# Patient Record
Sex: MALE | Race: WHITE | Employment: FULL TIME | URBAN - METROPOLITAN AREA
[De-identification: names, ages, dates, MRNs, and addresses within clinical notes are randomized per-mention and may not be internally consistent; named-entity substitution may affect disease eponyms.]

---

## 2018-05-06 ENCOUNTER — OFFICE VISIT (OUTPATIENT)
Dept: URGENT CARE | Facility: URGENT CARE | Age: 23
End: 2018-05-06
Payer: COMMERCIAL

## 2018-05-06 VITALS
HEART RATE: 76 BPM | SYSTOLIC BLOOD PRESSURE: 114 MMHG | BODY MASS INDEX: 22.92 KG/M2 | TEMPERATURE: 98.1 F | WEIGHT: 169 LBS | OXYGEN SATURATION: 100 % | DIASTOLIC BLOOD PRESSURE: 68 MMHG

## 2018-05-06 DIAGNOSIS — J30.1 ACUTE SEASONAL ALLERGIC RHINITIS DUE TO POLLEN: Primary | ICD-10-CM

## 2018-05-06 PROCEDURE — 99214 OFFICE O/P EST MOD 30 MIN: CPT | Performed by: FAMILY MEDICINE

## 2018-05-06 RX ORDER — OLOPATADINE HYDROCHLORIDE 1 MG/ML
1 SOLUTION/ DROPS OPHTHALMIC 2 TIMES DAILY
Qty: 5 ML | Refills: 3 | Status: SHIPPED | OUTPATIENT
Start: 2018-05-06 | End: 2021-09-07

## 2018-05-06 RX ORDER — OLOPATADINE HYDROCHLORIDE 1 MG/ML
1 SOLUTION/ DROPS OPHTHALMIC 2 TIMES DAILY
Qty: 5 ML | Refills: 3 | Status: CANCELLED | OUTPATIENT
Start: 2018-05-06

## 2018-05-06 RX ORDER — FLUTICASONE PROPIONATE 50 MCG
1-2 SPRAY, SUSPENSION (ML) NASAL DAILY
Qty: 1 BOTTLE | Refills: 11 | Status: SHIPPED | OUTPATIENT
Start: 2018-05-06 | End: 2020-10-23

## 2018-05-06 RX ORDER — PREDNISONE 20 MG/1
20 TABLET ORAL DAILY
Qty: 5 TABLET | Refills: 0 | Status: SHIPPED | OUTPATIENT
Start: 2018-05-06 | End: 2020-10-23

## 2018-05-06 NOTE — MR AVS SNAPSHOT
"              After Visit Summary   2018    Huber Bueno    MRN: [id number]           Patient Information     Date Of Birth          1995        Visit Information        Provider Department      2018 9:40 AM Fausto Neal MD [facility]        Today's Diagnoses     Acute seasonal allergic rhinitis due to pollen    -  1       Follow-ups after your visit        Who to contact     If you have questions or need follow up information about today's clinic visit or your schedule please contact [facility] directly at [phone].  Normal or non-critical lab and imaging results will be communicated to you by WorkThinkhart, letter or phone within 4 business days after the clinic has received the results. If you do not hear from us within 7 days, please contact the clinic through Verblingt or phone. If you have a critical or abnormal lab result, we will notify you by phone as soon as possible.  Submit refill requests through ControlCircle or call your pharmacy and they will forward the refill request to us. Please allow 3 business days for your refill to be completed.          Additional Information About Your Visit        MyChart Information     ControlCircle lets you send messages to your doctor, view your test results, renew your prescriptions, schedule appointments and more. To sign up, go to [email or web address] . Click on \"Log in\" on the left side of the screen, which will take you to the Welcome page. Then click on \"Sign up Now\" on the right side of the page.     You will be asked to enter the access code listed below, as well as some personal information. Please follow the directions to create your username and password.     Your access code is: [email or web address]  Expires: 2018  3:06 PM     Your access code will  in 90 days. If you need help or a new code, please call your [place] clinic or [phone].        Care EveryWhere ID     This is your Care EveryWhere ID. " This could be used by other organizations to access your [place] medical records  [id number]        Your Vitals Were     Pulse Temperature Pulse Oximetry BMI (Body Mass Index)          76 98.1  F (36.7  C) (Oral) 100% 22.92 kg/m2         Blood Pressure from Last 3 Encounters:   05/06/18 114/68   02/19/16 100/74   10/30/15 110/70    Weight from Last 3 Encounters:   05/06/18 169 lb (76.7 kg)   02/19/16 189 lb (85.7 kg)   10/30/15 182 lb (82.6 kg)              Today, you had the following     No orders found for display         Today's Medication Changes          These changes are accurate as of 5/6/18 11:59 PM.  If you have any questions, ask your nurse or doctor.               Start taking these medicines.        Dose/Directions    fluticasone 50 MCG/ACT spray   Commonly known as:  FLONASE   Used for:  Acute seasonal allergic rhinitis due to pollen   Started by:  Fausto Neal MD        Dose:  1-2 spray   Spray 1-2 sprays into both nostrils daily   Quantity:  1 Bottle   Refills:  11       olopatadine 0.1 % ophthalmic solution   Commonly known as:  PATANOL   Used for:  Acute seasonal allergic rhinitis due to pollen   Started by:  Fausto Neal MD        Dose:  1 drop   Place 1 drop into both eyes 2 times daily   Quantity:  5 mL   Refills:  3       predniSONE 20 MG tablet   Commonly known as:  DELTASONE   Used for:  Acute seasonal allergic rhinitis due to pollen   Started by:  Fausto Neal MD        Dose:  20 mg   Take 1 tablet (20 mg) by mouth daily   Quantity:  5 tablet   Refills:  0            Where to get your medicines      These medications were sent to [facility]/pharmacy #[id number] - [place], [place] - [address]  [address], [place] [address]     Phone:  [phone]     fluticasone 50 MCG/ACT spray    olopatadine 0.1 % ophthalmic solution    predniSONE 20 MG tablet                Primary Care Provider Office Phone # Fax #    Kiel Hernadez [phone]       [id number]  [facility] [address]        Equal Access to Services     RAAD GUTIERREZ : Hadii aad ku hadestelleamairani Sojohnny, waaxda luqadaha, qaybta miryammakiley fernandez, kali wallsoniaromero [place]. So [facility] [phone].    ATENCIÓN: Si habla español, tiene a neff disposición servicios gratuitos de asistencia lingüística. [facility] [phone].    We comply with applicable federal civil rights laws and [place] laws. We do not discriminate on the basis of race, color, national origin, age, disability, sex, sexual orientation, or gender identity.            Thank you!     Thank you for choosing [facility]  for your care. Our goal is always to provide you with excellent care. Hearing back from our patients is one way we can continue to improve our services. Please take a few minutes to complete the written survey that you may receive in the mail after your visit with us. Thank you!             Your Updated Medication List - Protect others around you: Learn how to safely use, store and throw away your medicines at www.disposemymeds.org.          This list is accurate as of 5/6/18 11:59 PM.  Always use your most recent med list.                   Brand Name Dispense Instructions for use Diagnosis    AFRIN NASAL SPRAY NA           CLARITIN PO           fluticasone 50 MCG/ACT spray    FLONASE    1 Bottle    Spray 1-2 sprays into both nostrils daily    Acute seasonal allergic rhinitis due to pollen       olopatadine 0.1 % ophthalmic solution    PATANOL    5 mL    Place 1 drop into both eyes 2 times daily    Acute seasonal allergic rhinitis due to pollen       predniSONE 20 MG tablet    DELTASONE    5 tablet    Take 1 tablet (20 mg) by mouth daily    Acute seasonal allergic rhinitis due to pollen

## 2018-05-06 NOTE — PROGRESS NOTES
SUBJECTIVE:   Huber Bueno is a 22 year old male presenting with a chief complaint of   Chief Complaint   Patient presents with     Urgent Care     Sinus Problem     pressure in upper nose and cheeks, sore throat, runny nose, sneezing, light cough     Ear Problem     feel plugged       He is an established patient of [facility].    URI Adult    Onset of symptoms was many year(s) ago.  Course of illness is worsening.    Severity moderate  Current and Associated symptoms: runny nose, stuffy nose, cough - productive and sore throat  Treatment measures tried include Tylenol/Ibuprofen.  Predisposing factors include None.        Review of Systems   Constitutional: Positive for fatigue.   HENT: Positive for postnasal drip, rhinorrhea, sinus pain, sinus pressure and sneezing.    Eyes: Negative.    Respiratory: Negative.    Cardiovascular: Negative.    Gastrointestinal: Negative.    Endocrine: Negative.    Genitourinary: Negative.    Musculoskeletal: Negative.    All other systems reviewed and are negative.      History reviewed. No pertinent past medical history.  Family History   Problem Relation Age of Onset     C.A.D. Paternal Grandfather      50'S     CANCER Paternal Grandfather      PROSTATE     Cardiovascular Maternal Aunt      VALVE AND RHYTHM     Current Outpatient Prescriptions   Medication Sig Dispense Refill     fluticasone (FLONASE) 50 MCG/ACT spray Spray 1-2 sprays into both nostrils daily 1 Bottle 11     olopatadine (PATANOL) 0.1 % ophthalmic solution Place 1 drop into both eyes 2 times daily 5 mL 3     predniSONE (DELTASONE) 20 MG tablet Take 1 tablet (20 mg) by mouth daily 5 tablet 0     Loratadine (CLARITIN PO)        Oxymetazoline HCl (AFRIN NASAL SPRAY NA)        Social History   Substance Use Topics     Smoking status: Never Smoker     Smokeless tobacco: Current User     Types: Chew     Alcohol use No       OBJECTIVE  /68  Pulse 76  Temp 98.1  F (36.7  C) (Oral)  Wt 169 lb (76.7 kg)  SpO2  100%  BMI 22.92 kg/m2    Physical Exam   Constitutional: He appears well-developed and well-nourished.   HENT:   Inferior turbs enlarged and there is clear drainage.   Eyes: EOM are normal. Pupils are equal, round, and reactive to light.   Neck: Normal range of motion. Neck supple.   Cardiovascular: Normal rate and regular rhythm.    Pulmonary/Chest: Effort normal and breath sounds normal.   Abdominal: Soft.   Musculoskeletal: Normal range of motion.   Neurological: He is alert. He has normal reflexes.   Skin: Skin is warm.   Nursing note and vitals reviewed.      Labs:  No results found for this or any previous visit (from the past 24 hour(s)).    X-Ray was not done.    ASSESSMENT:      ICD-10-CM    1. Acute seasonal allergic rhinitis due to pollen J30.1 predniSONE (DELTASONE) 20 MG tablet     fluticasone (FLONASE) 50 MCG/ACT spray     olopatadine (PATANOL) 0.1 % ophthalmic solution   Currently is on the right medications however I think he needs a burst of steroids we will give him this today.    In addition we will use eyedrops or antiallergy antiallergy dose drops    Medical Decision Making:    Differential Diagnosis:  URI Adult/Peds:  Acute left otitis media, Asthma, Croup, Influenza, Laryngitis, Sinusitis, Strep pharyngitis and Viral pharyngitis    Serious Comorbid Conditions:  Adult:  None    PLAN:    URI Adult:  Tylenol, Ibuprofen and Prednisone    Followup:    If not improving or if condition worsens, follow up with your Primary Care Provider    There are no Patient Instructions on file for this visit.     25 minutes in exam and consuling . Greater than 50% of time in discussion regarding allergy vs infection

## 2018-05-15 ASSESSMENT — ENCOUNTER SYMPTOMS
SINUS PAIN: 1
RHINORRHEA: 1
MUSCULOSKELETAL NEGATIVE: 1
GASTROINTESTINAL NEGATIVE: 1
EYES NEGATIVE: 1
SINUS PRESSURE: 1
FATIGUE: 1
CARDIOVASCULAR NEGATIVE: 1
RESPIRATORY NEGATIVE: 1
ENDOCRINE NEGATIVE: 1

## 2020-10-23 ENCOUNTER — VIRTUAL VISIT (OUTPATIENT)
Dept: FAMILY MEDICINE | Facility: CLINIC | Age: 25
End: 2020-10-23
Payer: COMMERCIAL

## 2020-10-23 DIAGNOSIS — Z20.822 SUSPECTED COVID-19 VIRUS INFECTION: Primary | ICD-10-CM

## 2020-10-23 PROCEDURE — 99203 OFFICE O/P NEW LOW 30 MIN: CPT | Mod: GT | Performed by: NURSE PRACTITIONER

## 2020-10-23 ASSESSMENT — ENCOUNTER SYMPTOMS
APPETITE CHANGE: 0
RHINORRHEA: 0
DIARRHEA: 1
VOMITING: 0
SINUS PRESSURE: 1
COUGH: 0
HEADACHES: 1
ABDOMINAL PAIN: 0
SORE THROAT: 0
CHILLS: 1
NAUSEA: 0
MYALGIAS: 0
SHORTNESS OF BREATH: 0
FEVER: 0
FATIGUE: 1

## 2020-10-23 NOTE — PROGRESS NOTES
"Huber Bueno is a 25 year old male who is being evaluated via a billable video visit.      The patient has been notified of following:     \"This video visit will be conducted via a call between you and your physician/provider. We have found that certain health care needs can be provided without the need for an in-person physical exam.  This service lets us provide the care you need with a video conversation.  If a prescription is necessary we can send it directly to your pharmacy.  If lab work is needed we can place an order for that and you can then stop by our lab to have the test done at a later time.    Video visits are billed at different rates depending on your insurance coverage.  Please reach out to your insurance provider with any questions.    If during the course of the call the physician/provider feels a video visit is not appropriate, you will not be charged for this service.\"    Patient has given verbal consent for Video visit? Yes  How would you like to obtain your AVS? Mail a copy  If you are dropped from the video visit, the video invite should be resent to: Text to cell phone: [phone]  Will anyone else be joining your video visit? No      Subjective     Huber Bueno is a 25 year old male who presents today via video visit for the following health issues:    HPI       Concern for COVID-19  About how many days ago did these symptoms start? 2 days  Is this your first visit for this illness? Yes  In the 14 days before your symptoms started, have you had close contact with someone with COVID-19 (Coronavirus)? No  Do you have a fever or chills? Yes, I felt feverish or had chills chills, no thermometer  Are you having new or worsening difficulty breathing? No  Do you have new or worsening cough? No  Have you had any new or unexplained body aches? No    Have you experienced any of the following NEW symptoms?    Headache: YES    Sore throat: No    Loss of taste or smell: No    Chest " pain: No    Diarrhea: YES    Rash: No     Fatigue: Yes    Ear pain: both    Sinus Pressure: YES  What treatments have you tried? Vitamin C, tylenol  Patient has a history of allergies but has not used any of his allergy medications because he wasn't sure what his symptoms were since COVID  Who do you live with? parents  Are you, or a household member, a healthcare worker or a ? No  Do you live in a nursing home, group home, or shelter? No  Do you have a way to get food/medications if quarantined? Yes, I have a friend or family member who can help me.    {Provider  Link to COVID SmartSet :337857}          Video Start Time: 8:14 AM    Additional provider notes: 2 days of symptoms. 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 He has hx of allergies. Wants to r/o COVID.    Review of Systems   Constitutional: Positive for chills and fatigue. Negative for appetite change and fever.   HENT: Positive for congestion, ear pain and sinus pressure. Negative for postnasal drip, rhinorrhea and sore throat.    Respiratory: Negative for cough and shortness of breath.    Cardiovascular: Negative for chest pain.   Gastrointestinal: Positive for diarrhea. Negative for abdominal pain, nausea and vomiting.   Musculoskeletal: Negative for myalgias.   Skin: Negative for rash.   Neurological: Positive for headaches.      Current Outpatient Medications   Medication     Loratadine (CLARITIN PO)     olopatadine (PATANOL) 0.1 % ophthalmic solution     Oxymetazoline HCl (AFRIN NASAL SPRAY NA)     No current facility-administered medications for this visit.      History reviewed. No pertinent past medical history.        Objective           Vitals:  No vitals were obtained today due to virtual visit.    Physical Exam     GENERAL: alert and no distress, sounds congested  EYES: Eyes grossly normal to inspection.  No discharge or erythema, or obvious scleral/conjunctival abnormalities.  RESP: No audible wheeze, cough,  or visible cyanosis.  No visible retractions or increased work of breathing.    SKIN: Visible skin clear. No significant rash, abnormal pigmentation or lesions.  NEURO: Cranial nerves grossly intact.  Mentation and speech appropriate for age.  PSYCH: Mentation appears normal, affect normal/bright, judgement and insight intact, normal speech and appearance well-groomed.            Assessment & Plan     Suspected COVID-19 virus infection  Symptoms consistent with [facility] testing guidelines. Discussed quarantining recommendations. Discussed process for scheduling COVID testing. Recommended ER visit if symptoms worsen and/or can't be managed at home.     Recommended he start back up his claritin and initiate Flonase (OTC). Only use Afrin for total of 4 days.    - Symptomatic COVID-19 Virus (Coronavirus) by PCR; Future     Tobacco Cessation:   reports that he has never smoked. His smokeless tobacco use includes chew.           Patient Instructions   Restart Claritin for allergies. You can also start using Flonase (over the counter) nasal spray to help open up nasal passage. Recommend only using Afrin for a total of 4 days as it can cause rebound congestion.     ________    COVID testing ordered today. You will receive a call to schedule that appointment.     Please quarantine until you receive your results.     If the results are negative, you need to be symptom free for 24 hours before ending quarantine.     If results are positive, you need to quarantine for 10 days from start of symptoms AND you need to be fever free (without the use of fever reducing medications) for 24 hours before ending quarantine.     If results are positive, asymptomatic household contacts will need to quarantine for 14 days.     If you develop worsening symptoms or difficulty breathing, please go to ER.        Return if symptoms worsen or fail to improve.    JERRI Sadler CNP  [facility]      Video-Visit  Details    Type of service:  Video Visit    Video End Time:8:24 AM    Originating Location (pt. Location): Home    Distant Location (provider location):  [facility]     Platform used for Video Visit: Anyone Home

## 2020-10-23 NOTE — PATIENT INSTRUCTIONS
Restart Claritin for allergies. You can also start using Flonase (over the counter) nasal spray to help open up nasal passage. Recommend only using Afrin for a total of 4 days as it can cause rebound congestion.     ________    COVID testing ordered today. You will receive a call to schedule that appointment.     Please quarantine until you receive your results.     If the results are negative, you need to be symptom free for 24 hours before ending quarantine.     If results are positive, you need to quarantine for 10 days from start of symptoms AND you need to be fever free (without the use of fever reducing medications) for 24 hours before ending quarantine.     If results are positive, asymptomatic household contacts will need to quarantine for 14 days.     If you develop worsening symptoms or difficulty breathing, please go to ER.

## 2020-10-24 ENCOUNTER — OFFICE VISIT (OUTPATIENT)
Dept: URGENT CARE | Facility: URGENT CARE | Age: 25
End: 2020-10-24
Payer: COMMERCIAL

## 2020-10-24 DIAGNOSIS — Z20.822 SUSPECTED COVID-19 VIRUS INFECTION: ICD-10-CM

## 2020-10-24 DIAGNOSIS — R43.2 LOSS OF TASTE: Primary | ICD-10-CM

## 2020-10-24 PROCEDURE — 99207 PR NO CHARGE LOS: CPT | Performed by: NURSE PRACTITIONER

## 2020-10-24 PROCEDURE — U0003 INFECTIOUS AGENT DETECTION BY NUCLEIC ACID (DNA OR RNA); SEVERE ACUTE RESPIRATORY SYNDROME CORONAVIRUS 2 (SARS-COV-2) (CORONAVIRUS DISEASE [COVID-19]), AMPLIFIED PROBE TECHNIQUE, MAKING USE OF HIGH THROUGHPUT TECHNOLOGIES AS DESCRIBED BY CMS-2020-01-R: HCPCS | Performed by: NURSE PRACTITIONER

## 2020-10-24 NOTE — PROGRESS NOTES
Pt already was seen virtually yesterday. He is having no chest pain or shortness of breath. Just needs testing.    Here for covid test only which was ordered yesterday.    Curbside test done.

## 2020-10-25 LAB
SARS-COV-2 RNA SPEC QL NAA+PROBE: NOT DETECTED
SPECIMEN SOURCE: NORMAL

## 2021-01-15 ENCOUNTER — HEALTH MAINTENANCE LETTER (OUTPATIENT)
Age: 26
End: 2021-01-15

## 2021-09-05 ENCOUNTER — HEALTH MAINTENANCE LETTER (OUTPATIENT)
Age: 26
End: 2021-09-05

## 2021-09-07 ENCOUNTER — VIRTUAL VISIT (OUTPATIENT)
Dept: FAMILY MEDICINE | Facility: CLINIC | Age: 26
End: 2021-09-07
Payer: COMMERCIAL

## 2021-09-07 DIAGNOSIS — Z20.822 SUSPECTED COVID-19 VIRUS INFECTION: Primary | ICD-10-CM

## 2021-09-07 PROCEDURE — 99213 OFFICE O/P EST LOW 20 MIN: CPT | Mod: GT | Performed by: NURSE PRACTITIONER

## 2021-09-07 NOTE — PROGRESS NOTES
Huber is a 26 year old who is being evaluated via a billable video visit.      How would you like to obtain your AVS? MyChart  If the video visit is dropped, the invitation should be resent by: Text to cell phone: [phone]  Will anyone else be joining your video visit? No      Video Start Time: 2:54 PM    Assessment & Plan     Suspected COVID-19 virus infection  2 day history of COVID-like symptoms including chills, headache, dry cough, body aches, dyspnea and abnormal taste. Patient was in contact with positive COVID person. Recommend COVID testing, order placed. Patient to call and schedule. Reviewed quarantine guidelines and supportive cares per patient instructions. Will notify patient of results and recommendations.   - Symptomatic COVID-19 Virus (Coronavirus) by PCR; Future           Tobacco Cessation:   reports that he has never smoked. His smokeless tobacco use includes chew.  Tobacco Cessation Action Plan: Not addressed    See Patient Instructions    Return in about 1 week (around 9/14/2021), or if symptoms worsen or fail to improve.    JERRI Rivas [facility]    Subjective   Huber is a 26 year old who presents for the following health issues     HPI       Concern for COVID-19  About how many days ago did these symptoms start? 2 days  Is this your first visit for this illness? Yes  In the 14 days before your symptoms started, have you had close contact with someone with COVID-19 (Coronavirus)? Yes, I have been in contact with someone who has COVID-19/Coronavirus (confirmed by lab test).  Do you have a fever or chills? Yes, I felt feverish or had chills  Are you having new or worsening difficulty breathing? Yes   Please describe what kind of difficulty you are having breathing:Mild dyspnea (able to do ADLs without difficulty, mild shortness of breath with activities such as climbing one or two flights of stairs or walking briskly)  Do you have new or worsening  cough? Yes, it's a dry cough.   Have you had any new or unexplained body aches? YES    Have you experienced any of the following NEW symptoms?    Headache: YES    Sore throat: YES    Loss of taste or smell: No    Chest pain: No    Diarrhea: No    Rash: No  What treatments have you tried? IBU  Who do you live with? Mom and dad  Are you, or a household member, a healthcare worker or a ? No  Do you live in a nursing home, group home, or shelter? No  Do you have a way to get food/medications if quarantined? Yes, I have a friend or family member who can help me.            Review of Systems   Constitutional, HEENT, cardiovascular, pulmonary, gi and gu systems are negative, except as otherwise noted.      Objective           Vitals:  No vitals were obtained today due to virtual visit.    Physical Exam   GENERAL: Healthy, alert and no distress  EYES: Eyes grossly normal to inspection.  No discharge or erythema, or obvious scleral/conjunctival abnormalities.  RESP: No audible wheeze, cough, or visible cyanosis.  No visible retractions or increased work of breathing.    SKIN: Visible skin clear. No significant rash, abnormal pigmentation or lesions.  NEURO: Cranial nerves grossly intact.  Mentation and speech appropriate for age.  PSYCH: Mentation appears normal, affect normal/bright, judgement and insight intact, normal speech and appearance well-groomed.    Diagnostic Test Results:  COVID-19 pending            Video-Visit Details    Type of service:  Video Visit    Video End Time:3:01 PM    Originating Location (pt. Location): Home    Distant Location (provider location):  [facility]     Platform used for Video Visit: [employer]     Chart documentation with Dragon Voice recognition Software. Although reviewed after completion, some words and grammatical errors may remain.

## 2021-09-07 NOTE — PATIENT INSTRUCTIONS
Suspected COVID-19 virus infection  2 day history of COVID-like symptoms including chills, headache, dry cough, body aches, dyspnea and abnormal taste. Patient was in contact with positive COVID person. Recommend COVID testing, order placed. Patient to call and schedule. Reviewed quarantine guidelines and supportive cares per patient instructions. Will notify patient of results and recommendations.   - Symptomatic COVID-19 Virus (Coronavirus) by PCR; Future

## 2021-09-08 ENCOUNTER — ALLIED HEALTH/NURSE VISIT (OUTPATIENT)
Dept: FAMILY MEDICINE | Facility: CLINIC | Age: 26
End: 2021-09-08
Payer: COMMERCIAL

## 2021-09-08 DIAGNOSIS — Z20.822 SUSPECTED COVID-19 VIRUS INFECTION: ICD-10-CM

## 2021-09-08 DIAGNOSIS — R05.9 COUGH: Primary | ICD-10-CM

## 2021-09-08 PROCEDURE — 99207 PR NO CHARGE LOS: CPT

## 2021-09-08 PROCEDURE — U0005 INFEC AGEN DETEC AMPLI PROBE: HCPCS

## 2021-09-08 PROCEDURE — U0003 INFECTIOUS AGENT DETECTION BY NUCLEIC ACID (DNA OR RNA); SEVERE ACUTE RESPIRATORY SYNDROME CORONAVIRUS 2 (SARS-COV-2) (CORONAVIRUS DISEASE [COVID-19]), AMPLIFIED PROBE TECHNIQUE, MAKING USE OF HIGH THROUGHPUT TECHNOLOGIES AS DESCRIBED BY CMS-2020-01-R: HCPCS

## 2021-09-09 LAB — SARS-COV-2 RNA RESP QL NAA+PROBE: POSITIVE

## 2022-02-20 ENCOUNTER — HEALTH MAINTENANCE LETTER (OUTPATIENT)
Age: 27
End: 2022-02-20

## 2022-10-23 ENCOUNTER — HEALTH MAINTENANCE LETTER (OUTPATIENT)
Age: 27
End: 2022-10-23

## 2023-04-02 ENCOUNTER — HEALTH MAINTENANCE LETTER (OUTPATIENT)
Age: 28
End: 2023-04-02

## 2024-06-02 ENCOUNTER — HEALTH MAINTENANCE LETTER (OUTPATIENT)
Age: 29
End: 2024-06-02